# Patient Record
(demographics unavailable — no encounter records)

---

## 2025-02-21 NOTE — END OF VISIT
[FreeTextEntry3] : I personally spent a total of 35 minutes providing evaluation and management services for this patient on the date of service. This included both face-to-face time spent counseling and coordinating care with the patient, as well as non-face-to-face time reviewing records, analyzing diagnostic results, and completing medical documentation. Direct patient counseling constituted more than 50% of the total encounter time and included discussing the assessment, treatment options, and addressing the patient's questions regarding their condition and care plan.

## 2025-02-21 NOTE — PLAN
[FreeTextEntry1] : #Belching Symptoms could be due to GERD vs worsening sliding hiatal hernia.  -Rec trial of simethicone QID PRN -If no improvement with simethicone then rec to double dose of PPI of 40mg BID -If no improvement with doubling PPI dose then can trial baclofen; discussed CNS side effects of baclofen and had risk/benefit discussion that baclofen should only be used as last resort if above tx options do not alleviate patient's symptoms. -Rec to avoid any carbonated beverages, citrus foods, caffeinated beverages.  -If symptoms still refractory can repeat imaging to assess whether hiatal hernia has worsened.

## 2025-02-21 NOTE — HISTORY OF PRESENT ILLNESS
[de-identified] : 90F here for a f/u w/ CC of abdominal bloating  Accompanied by daughter who provides history.   Patient complaining of at least 7-8 weeks of increased belching, sensation of abdominal bloating, and upper abdominal pain/discomfort. She takes pantoprazole 40mg once a day. No other anti-acid meds. Denies symptoms of acid-reflux, nausea, vomiting, diarrhea, constipation. Has BMs twice a day, she's passing gas, no increased flatulence. Does not feel constipation. No carbonated beverages, citrus foods, has been on pantoprazole for 10+ yrs. Symptoms w/o pantoprazole are worse.   Also endorsing a lingering cough after covid diagnosis one month ago.   Reviewed previous CTAP last summer 2024 that shows a small sliding hiatal hernia

## 2025-02-21 NOTE — PHYSICAL EXAM
[Normal Sclera/Conjunctiva] : normal sclera/conjunctiva [Normal Oropharynx] : the oropharynx was normal [Normal TMs] : both tympanic membranes were normal [No Lymphadenopathy] : no lymphadenopathy [Soft] : abdomen soft [Normal Gait] : normal gait [Alert and Oriented x3] : oriented to person, place, and time [Normal] : affect was normal and insight and judgment were intact [de-identified] : frequent belching audible  [de-identified] : epigastric discomfort upon palpation, no rebound or guarding, normoactive BS.

## 2025-07-30 NOTE — REVIEW OF SYSTEMS
[Arthralgias] : arthralgias [Joint Pain] : joint pain [Joint Stiffness] : joint stiffness [As Noted in HPI] : as noted in HPI [Difficulty Walking] : difficulty walking [Negative] : Heme/Lymph [Joint Swelling] : no joint swelling [FreeTextEntry7] : Diverticulosis, belching/GERD

## 2025-07-30 NOTE — PHYSICAL EXAM
[General Appearance - Alert] : alert [General Appearance - In No Acute Distress] : in no acute distress [General Appearance - Well Nourished] : well nourished [Extraocular Movements] : extraocular movements were intact [Outer Ear] : the ears and nose were normal in appearance [Oropharynx] : the oropharynx was normal [Neck Appearance] : the appearance of the neck was normal [Respiration, Rhythm And Depth] : normal respiratory rhythm and effort [Nail Clubbing] : no clubbing  or cyanosis of the fingernails [Musculoskeletal - Swelling] : no joint swelling seen [Motor Tone] : muscle strength and tone were normal [] : no rash [No Focal Deficits] : no focal deficits [Oriented To Time, Place, And Person] : oriented to person, place, and time [FreeTextEntry1] : Positive Tinnel's test b/l hands

## 2025-07-30 NOTE — HISTORY OF PRESENT ILLNESS
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 John Reyes for history of Psoriatic Arthritis.   Dx of plaque psoriasis in 1970s, developed persistent knee pain since since then, per chart review of available documentation pt dx with presumed mild PsA (no erosive changes) and has been on SSZ since 2008. Per note patient has improvement of joint pain/AM stiffness with SSZ. Patient never trialled on MTX, HCQ, and biologics 2/2 her hesitancy due to side effects profiles. In same time frame had b/l TKR with initial good results but more knee pain in last 10 years tho imaging stable. In 2017 patient was noted to have mild flexion deformity at R 3rd PIP joint along with occasional trigger finger.   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 Progressive over last year, all day stiffness, Tylenol helps slightly. slight improvement with hand exercise, no mike synovitis. Inflammatory arthritis ROS negative for symmetrical peripheral joint synovitis, enthesitis, dactylitis, eye inflammation, inflammatory low back pain, IBD. No active psoriasis x years. Hasn't seen Rheumatology in some time, can't recall exactly but none since began living with daughter 10 years ago, has been getting SSZ from PMD, no SE or issues with infections.   - Ocular hx - L cornea began to deteriorate (due to psoriasis? tho not clear), then got infected. a corneal transplant was performed x2. second one did not clear but is now stable. R eye macular denervation with surgery. See's Dr. Rosa ALVAREZ.  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 aunt with "RA"   ---------- 3/21/24 -- Doing well since last visit, adding Tylenol helping with pain, no synovitis, no extra-articular inflammatory sx. CTS sx stable.  9/27/24 -- Slight improvement with increased SSZ dose but still significant joint pain, no rashes, Tylenol helping at BID dosing, no synovitis, no extra-articular inflammatory sx, no infectious sx. CTS improved. Ambulates with walker, mostly steady, some episodes of unsteadiness with unclear etiology but no falls.   7/30/25 -- No synovitis, psoriasis, no extra-articular inflammatory sx. Tolerating SSZ, no infectious sx. Diffuse ongoing OA mediated pain, gabapentin is helping partially. No falls.

## 2025-07-30 NOTE — ASSESSMENT
Please offer appointment with Dr. Luna on 4/7/25 at 1:15 pm.    Message routed to ENT PSR  to schedule.   [FreeTextEntry1] : LARISSA ROGERS is a 90 year old woman with --  # History of psoriasis and psoriatic arthritis - no current rash, no overt inflammatory activity, imaging + localized erosion in hand, but otherwise no active synovitis on US, other areas with only OA changes.  # Age appropriate ESR and low + RF but serologies otherwise negative - c/w SSZ 2g daily  - Labs last done in 2024 - repeat labs with upcoming PMD labs using orders from 7/25/25  - monitor for recurrence of rash or synovitis   # diffuse OA changes, R 3rd Dupuytren's contracture # C spine pain/radicular sx, + CTS sx in b/l hands - c/w cock up splints QHS - c/w topical Voltaren gel prn pain up to TID to affected peripheral joints for OA -related pain as she does get benefit - c/w tylenol up to ES 2 tabs BID  - increase gabapentin 200mg QHS as tolerated. If overly sedating pt advised to taper back to 100mg QHS  - Massage therapy referral  # Misc -renewed pantoprazole until controlled with PMD as works better than OTC omeprazole  All questions and concerns addressed to my best possible ability, patient verbalizes understanding and is agreeable. TEB 6 months